# Patient Record
Sex: MALE | Race: WHITE | NOT HISPANIC OR LATINO | URBAN - METROPOLITAN AREA
[De-identification: names, ages, dates, MRNs, and addresses within clinical notes are randomized per-mention and may not be internally consistent; named-entity substitution may affect disease eponyms.]

---

## 2019-05-03 ENCOUNTER — EMERGENCY (EMERGENCY)
Facility: HOSPITAL | Age: 49
LOS: 1 days | Discharge: HOME | End: 2019-05-03
Attending: EMERGENCY MEDICINE
Payer: COMMERCIAL

## 2019-05-03 VITALS
SYSTOLIC BLOOD PRESSURE: 146 MMHG | OXYGEN SATURATION: 100 % | DIASTOLIC BLOOD PRESSURE: 87 MMHG | TEMPERATURE: 98 F | WEIGHT: 190.04 LBS | HEIGHT: 72 IN | HEART RATE: 61 BPM | RESPIRATION RATE: 18 BRPM

## 2019-05-03 PROCEDURE — 99283 EMERGENCY DEPT VISIT LOW MDM: CPT

## 2019-05-03 NOTE — ED PROVIDER NOTE - OBJECTIVE STATEMENT
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 Patient states he was lifting a heavy piece of exercise equipment this AM when he felt a popping sensation to his left chest associated with significant pain. No pain to shoulder or LUE. Endorsing decreased weakness to LUE (lifting, pushing/pulling). No other associated sx: no numbness to LUE, no fevers or chills, nausea or vomiting, headache, dizziness, shortness of breath, abdominal pain. No prior hx of injury to LUE, left shoulder, chest wall. No meds, no allergies. No tobacco.

## 2019-05-03 NOTE — ED PROVIDER NOTE - PHYSICAL EXAMINATION
General: Well appearing, alert, oriented, no acute distress. Resting on bed.  HEENT: PERRLA EOMI. No trauma/bruising noted to head or face.   CV: Regular rate and rhythm, S1/S2, no murmurs/rubs/gallops noted on exam.  Lungs: Clear to ascultation bilaterally, no wheezes/crackles/rales noted on exam.    MSK: Full ROM of lower extremities bilaterally and RUE. Full ROM of neck.     Left upper extremity: Mildly limited active abduction of LUE, full passive abduction of LUE. Otherwise full ROM of LUE, active and passive flexion/extension/adduction. No skeletal bony changes noted, no clinical signs of shoulder dislocation noted. No tenderness to palpation to left shoulder. Bicep/tricep strength intact.  strength intact. Decreased strength to pulling/pushing with LUE.   Significant pain to palpation to area of left superior enrique-lateral chest wall.     Neuro: Awake, A+O x4, moving all extremities spontaneously. CN 2-12 grossly intact. No nystagmus noted. Strength and sensation grossly intact to all extremities EXCEPT LUE as mentioned above. Ambulatory w/o assist, normal gait.   Extremities: No swelling or edema noted to extremities. No calf tenderness to palpation.   Skin: No rash or bruising noted on exam.

## 2019-05-03 NOTE — ED PROVIDER NOTE - CLINICAL SUMMARY MEDICAL DECISION MAKING FREE TEXT BOX
48M, presenting with left chest wall pain and decreased strength to LUE after lifting heavy equipment this AM. Exam as above. 48M, presenting with left chest wall pain and decreased strength to LUE after lifting heavy equipment this AM. Exam as above. Low clinical suspicion for skeletal injury. High clinical suspicion for acute muscular injury. Will require ortho f/u for advanced imaging and/or surgery and/or PT and/or further intervention. Pain control declined at this time. Anticipate d/c home with strict instructions on limitation of movement/lifting, work restriction, and ortho/PMD f/u  Yovany Carcamo MD, PGY2 Emergency Medicine

## 2019-05-03 NOTE — ED PROVIDER NOTE - NS ED ROS FT
Please see HPI section of chart for further detailed Review of Systems    left anterior chest wall pain

## 2019-05-03 NOTE — ED PROVIDER NOTE - NSFOLLOWUPCLINICS_GEN_ALL_ED_FT
[facility]  Orthopedic Surgery  [address] [address]  Phone: [phone]  Fax:     [facility]  Sports Medicine  [address] [place] [address]  Phone: [phone]  Fax:   Follow Up Time:

## 2019-05-03 NOTE — ED PROVIDER NOTE - NSFOLLOWUPINSTRUCTIONS_ED_ALL_ED_FT
Please follow up with Orthopedics and/or Sports Medicine this week for further care    Wear sling for comfort only, ensure frequent range of motion exercises     Return to hospital for any new or concerning symptoms, including but not limited to: fevers, chills, nausea, vomiting, headache, dizziness, lightheadedness, chest pain, shortness of breath, difficulty breathing, abdominal pain, weakness, or any other new or concerning symptoms.    Take Tylenol up to 650 mg every 6 hours as needed for pain.  Take motrin 600mg every 6 hours as needed for pain

## 2019-05-03 NOTE — ED ADULT NURSE NOTE - OBJECTIVE STATEMENT
48 y m came to the ed c/o left shoulder pain. patient said he was moving heavy gym equipment around 715 this morning. states hearing a pop then severe pain in the left upper chest/shoulder area. denies numbness/tingling. able to move extremity although excessive movements cause severe pain. good  strength

## 2019-05-03 NOTE — ED PROVIDER NOTE - ATTENDING CONTRIBUTION TO CARE
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 Pt developed pain s/p lifting heavy piece of exercise equipment this AM. Denies sob, palpitations, radiation of pain. Worse with movement of arm. Denies numbness, tingling, coldness to hand. Denies any other injuries.    PE: Well appearing male in NAD, NCAT, MMM, Trachea midline, Normal conjunctiva, lungs CTAB, S1/S2 RRR, Normal perfusion, 2+ radial pulses bilat, Abdomen Soft, NTND, No rebound/guarding, No LE edema, No deformity of extremities, No rashes. + L super-lateral chest wall ttp, 5/5 strength bilat UE elbow flex/ext, wrist flex/ext, intrinsic muscles hands. Sensation grossly intact bilat UE. +Pain, limited strength internal rotation L arm, external rotation intact. Pain with passive ROM L shoulder, no ttp of L shoulder.    C/w muscular strain vs tear, likely pectoral. Pt offered XR, but does not wish to have performed, I believe the likelihood of fracture or other acute pathology is extremely low. Declining pain meds. Will f/u with orthopedics/sports med. Will give sling for comfort. To d/c. Return precautions discussed with patient in detail. - Bradford Brandt MD

## 2020-12-08 PROBLEM — Z78.9 OTHER SPECIFIED HEALTH STATUS: Chronic | Status: ACTIVE | Noted: 2019-05-03

## 2020-12-11 DIAGNOSIS — Z01.818 ENCOUNTER FOR OTHER PREPROCEDURAL EXAMINATION: ICD-10-CM

## 2020-12-11 PROBLEM — Z00.00 ENCOUNTER FOR PREVENTIVE HEALTH EXAMINATION: Status: ACTIVE | Noted: 2020-12-11

## 2020-12-12 ENCOUNTER — APPOINTMENT (OUTPATIENT)
Dept: DISASTER EMERGENCY | Facility: CLINIC | Age: 50
End: 2020-12-12

## 2020-12-14 VITALS
TEMPERATURE: 98 F | OXYGEN SATURATION: 98 % | RESPIRATION RATE: 12 BRPM | HEART RATE: 65 BPM | WEIGHT: 184.97 LBS | SYSTOLIC BLOOD PRESSURE: 130 MMHG | DIASTOLIC BLOOD PRESSURE: 75 MMHG | HEIGHT: 71 IN

## 2020-12-14 LAB — SARS-COV-2 N GENE NPH QL NAA+PROBE: NOT DETECTED

## 2020-12-14 NOTE — H&P PST ADULT - ASSESSMENT
This a 50 year old male PMH :  Recent Covid positive recovered 11/2/20  with no other medical history . He presented to [facility] with complaints of dyspnea and palpitations. He was referred to a cardiologist who  sent him for Coronary CT angio  Holter monitor and NST. His NST was abnormal with anterior ischemia noted. He was started on a aspirin and  referred for a cardiac cath      Plan: PRE-PROCEDURE ASSESSMENT  Cardiac cath with possible intervention   -Patient seen and examined  -Labs reviewed  -Pre-procedure teaching completed with patient   -Questions answered about patients concerns    -instructed to NPO after midnight.

## 2020-12-14 NOTE — H&P PST ADULT - HISTORY OF PRESENT ILLNESS
Narrative: This a 50 year old male PMH :  Recent Covid positive recovered 11/2/20  with no other medical history . He presented to [facility] with complaints of dyspnea and palpitations. He was referred to a cardiologist who  sent him for Coronary CT angio  Holter monitor and NST. His NST was abnormal with anterior ischemia noted. He was started on a aspirin and  referred for a cardiac cath     Symptoms:        Angina (Class):        Ischemic Symptoms: dyspnea      Heart Failure:        Systolic/Diastolic/Combined:        NYHA Class (within 2 weeks):     Assessment of LVEF (Must be within 6 months):       EF: 57 %        Assessed by: NST        Date: 11/11/20     Prior Cardiac Interventions (LHC, stents, CABG): none            Noninvasive Testing:   Stress Test: Date: 11/11/20        Protocol: Tam        Duration of Exercise: stage III        Symptoms: none        EKG Changes:        DTS:        Myocardial Imaging: ischemia Equivocal , normal LV function with a  EF 57 %  with a small zone of anterior ischemia        Risk Assessment (Low, Medium, High):     Echo: 11/16/20: Normal LV size and function, wall motion and diastolic filling , normal right and left atria and right ventricular, mild valvular abnormalities      Antianginal Therapies:        Beta Blockers:         Calcium Channel Blockers:        Long Acting Nitrates:        Ranexa:     Associated Risk Factors:        Cerebrovascular Disease: N/A       Chronic Lung Disease: N/A       Peripheral Arterial Disease: N/A       Chronic Kidney Disease (if yes, what is GFR): N/A       Uncontrolled Diabetes (if yes, what is HgbA1C or FBS): N/A       Poorly Controlled Hypertension (if yes, what is SBP): N/A       Morbid Obesity (if yes, what is BMI): N/A       History of Recent Ventricular Arrhythmia: N/A       Inability to Ambulate Safely: N/A       Need for Therapeutic Anticoagulation: N/A       Antiplatelet or Contrast Allergy: N/A Narrative: This a 50 year old male PMH :  Recent Covid positive recovered 11/2/20  with no other medical history . He presented to [facility] with complaints of dyspnea and palpitations. He was referred to a cardiologist who  sent him for Coronary CT angio  Holter monitor and NST. His NST was abnormal with anterior ischemia noted. He was started on a aspirin and  referred for a cardiac cath     Symptoms:        Angina (Class): Anginal equivalent class 2-3       Ischemic Symptoms: dyspnea      Heart Failure:        Systolic/Diastolic/Combined:        NYHA Class (within 2 weeks):     Assessment of LVEF (Must be within 6 months):       EF: 57 %        Assessed by: NST        Date: 11/11/20     Prior Cardiac Interventions (LHC, stents, CABG): none            Noninvasive Testing:   Stress Test: Date: 11/11/20        Protocol: Tam        Duration of Exercise: stage III        Symptoms: none        EKG Changes:        DTS:        Myocardial Imaging: ischemia Equivocal , normal LV function with a  EF 57 %  with a small zone of anterior ischemia        Risk Assessment (Low, Medium, High):     Echo: 11/16/20: Normal LV size and function, wall motion and diastolic filling , normal right and left atria and right ventricular, mild valvular abnormalities      Antianginal Therapies:        Beta Blockers:         Calcium Channel Blockers:        Long Acting Nitrates:        Ranexa:     Associated Risk Factors:        Cerebrovascular Disease: N/A       Chronic Lung Disease: N/A       Peripheral Arterial Disease: N/A       Chronic Kidney Disease (if yes, what is GFR): N/A       Uncontrolled Diabetes (if yes, what is HgbA1C or FBS): N/A       Poorly Controlled Hypertension (if yes, what is SBP): N/A       Morbid Obesity (if yes, what is BMI): N/A       History of Recent Ventricular Arrhythmia: N/A       Inability to Ambulate Safely: N/A       Need for Therapeutic Anticoagulation: N/A       Antiplatelet or Contrast Allergy: N/A Narrative: This a 50 year old male PMH :  Recent Covid positive recovered 11/2/20  with no other medical history . He presented to [facility] with complaints of dyspnea and palpitations. He was referred to a cardiologist who  sent him for Coronary CT angio  Holter monitor and NST. His NST was abnormal with anterior ischemia noted. He was started on a aspirin and  referred for a cardiac cath     ASA 2  Mallampati II  GFR 99  Creat 0.9  Bleeding Risk 1.6%  COVID19 - 12/14/2020 negative    Symptoms:        Angina (Class): Anginal equivalent class 2-3       Ischemic Symptoms: dyspnea      Heart Failure:        Systolic/Diastolic/Combined:        NYHA Class (within 2 weeks):     Assessment of LVEF (Must be within 6 months):       EF: 57 %        Assessed by: NST        Date: 11/11/20     Prior Cardiac Interventions (LHC, stents, CABG): none            Noninvasive Testing:   Stress Test: Date: 11/11/20        Protocol: Tam        Duration of Exercise: stage III        Symptoms: none        EKG Changes:        DTS:        Myocardial Imaging: ischemia Equivocal , normal LV function with a  EF 57 %  with a small zone of anterior ischemia        Risk Assessment (Low, Medium, High):     Echo: 11/16/20: Normal LV size and function, wall motion and diastolic filling , normal right and left atria and right ventricular, mild valvular abnormalities      Antianginal Therapies:        Beta Blockers:         Calcium Channel Blockers:        Long Acting Nitrates:        Ranexa:     Associated Risk Factors:        Cerebrovascular Disease: N/A       Chronic Lung Disease: N/A       Peripheral Arterial Disease: N/A       Chronic Kidney Disease (if yes, what is GFR): N/A       Uncontrolled Diabetes (if yes, what is HgbA1C or FBS): N/A       Poorly Controlled Hypertension (if yes, what is SBP): N/A       Morbid Obesity (if yes, what is BMI): N/A       History of Recent Ventricular Arrhythmia: N/A       Inability to Ambulate Safely: N/A       Need for Therapeutic Anticoagulation: N/A       Antiplatelet or Contrast Allergy: N/A

## 2020-12-14 NOTE — H&P PST ADULT - NSICDXPASTSURGICALHX_GEN_ALL_CORE_FT
PAST SURGICAL HISTORY:  No significant past surgical history     No significant past surgical history      PAST SURGICAL HISTORY:  No significant past surgical history     S/P shoulder surgery left 2019

## 2020-12-14 NOTE — H&P PST ADULT - NSICDXPASTMEDICALHX_GEN_ALL_CORE_FT
PAST MEDICAL HISTORY:  History of 2019 novel coronavirus disease (COVID-19) recovered 11/2/20    No pertinent past medical history     No pertinent past medical history

## 2020-12-14 NOTE — H&P PST ADULT - NEGATIVE NEUROLOGICAL SYMPTOMS
no difficulty walking/no focal seizures/no syncope/no headache/no weakness/no generalized seizures/no tremors/no vertigo/no loss of sensation/no paresthesias/no transient paralysis

## 2020-12-15 ENCOUNTER — OUTPATIENT (OUTPATIENT)
Dept: OUTPATIENT SERVICES | Facility: HOSPITAL | Age: 50
LOS: 1 days | End: 2020-12-15
Payer: COMMERCIAL

## 2020-12-15 ENCOUNTER — TRANSCRIPTION ENCOUNTER (OUTPATIENT)
Age: 50
End: 2020-12-15

## 2020-12-15 VITALS
HEART RATE: 65 BPM | SYSTOLIC BLOOD PRESSURE: 118 MMHG | OXYGEN SATURATION: 98 % | DIASTOLIC BLOOD PRESSURE: 74 MMHG | RESPIRATION RATE: 20 BRPM

## 2020-12-15 DIAGNOSIS — R94.39 ABNORMAL RESULT OF OTHER CARDIOVASCULAR FUNCTION STUDY: ICD-10-CM

## 2020-12-15 DIAGNOSIS — Z98.890 OTHER SPECIFIED POSTPROCEDURAL STATES: Chronic | ICD-10-CM

## 2020-12-15 LAB
ANION GAP SERPL CALC-SCNC: 11 MMOL/L — SIGNIFICANT CHANGE UP (ref 5–17)
APTT BLD: 34.2 SEC — SIGNIFICANT CHANGE UP (ref 27.5–35.5)
BUN SERPL-MCNC: 22 MG/DL — HIGH (ref 8–20)
CALCIUM SERPL-MCNC: 9.1 MG/DL — SIGNIFICANT CHANGE UP (ref 8.6–10.2)
CHLORIDE SERPL-SCNC: 103 MMOL/L — SIGNIFICANT CHANGE UP (ref 98–107)
CO2 SERPL-SCNC: 27 MMOL/L — SIGNIFICANT CHANGE UP (ref 22–29)
CREAT SERPL-MCNC: 0.9 MG/DL — SIGNIFICANT CHANGE UP (ref 0.5–1.3)
GLUCOSE SERPL-MCNC: 98 MG/DL — SIGNIFICANT CHANGE UP (ref 70–99)
HCT VFR BLD CALC: 51.1 % — HIGH (ref 39–50)
HGB BLD-MCNC: 17.6 G/DL — HIGH (ref 13–17)
INR BLD: 1.08 RATIO — SIGNIFICANT CHANGE UP (ref 0.88–1.16)
MCHC RBC-ENTMCNC: 30.3 PG — SIGNIFICANT CHANGE UP (ref 27–34)
MCHC RBC-ENTMCNC: 34.4 GM/DL — SIGNIFICANT CHANGE UP (ref 32–36)
MCV RBC AUTO: 88 FL — SIGNIFICANT CHANGE UP (ref 80–100)
PLATELET # BLD AUTO: 225 K/UL — SIGNIFICANT CHANGE UP (ref 150–400)
POTASSIUM SERPL-MCNC: 3.9 MMOL/L — SIGNIFICANT CHANGE UP (ref 3.5–5.3)
POTASSIUM SERPL-SCNC: 3.9 MMOL/L — SIGNIFICANT CHANGE UP (ref 3.5–5.3)
PROTHROM AB SERPL-ACNC: 12.5 SEC — SIGNIFICANT CHANGE UP (ref 10.6–13.6)
RBC # BLD: 5.81 M/UL — HIGH (ref 4.2–5.8)
RBC # FLD: 12.5 % — SIGNIFICANT CHANGE UP (ref 10.3–14.5)
SODIUM SERPL-SCNC: 141 MMOL/L — SIGNIFICANT CHANGE UP (ref 135–145)
WBC # BLD: 4.09 K/UL — SIGNIFICANT CHANGE UP (ref 3.8–10.5)
WBC # FLD AUTO: 4.09 K/UL — SIGNIFICANT CHANGE UP (ref 3.8–10.5)

## 2020-12-15 PROCEDURE — 85610 PROTHROMBIN TIME: CPT

## 2020-12-15 PROCEDURE — 93005 ELECTROCARDIOGRAM TRACING: CPT

## 2020-12-15 PROCEDURE — 93458 L HRT ARTERY/VENTRICLE ANGIO: CPT

## 2020-12-15 PROCEDURE — 93010 ELECTROCARDIOGRAM REPORT: CPT

## 2020-12-15 PROCEDURE — C1760: CPT

## 2020-12-15 PROCEDURE — 80048 BASIC METABOLIC PNL TOTAL CA: CPT

## 2020-12-15 PROCEDURE — C1769: CPT

## 2020-12-15 PROCEDURE — 99152 MOD SED SAME PHYS/QHP 5/>YRS: CPT

## 2020-12-15 PROCEDURE — C1887: CPT

## 2020-12-15 PROCEDURE — 85027 COMPLETE CBC AUTOMATED: CPT

## 2020-12-15 PROCEDURE — 36415 COLL VENOUS BLD VENIPUNCTURE: CPT

## 2020-12-15 PROCEDURE — C1894: CPT

## 2020-12-15 PROCEDURE — 85730 THROMBOPLASTIN TIME PARTIAL: CPT

## 2020-12-15 RX ORDER — ASPIRIN/CALCIUM CARB/MAGNESIUM 324 MG
81 TABLET ORAL
Qty: 0 | Refills: 0 | DISCHARGE

## 2020-12-15 RX ORDER — ASPIRIN/CALCIUM CARB/MAGNESIUM 324 MG
81 TABLET ORAL DAILY
Refills: 0 | Status: DISCONTINUED | OUTPATIENT
Start: 2020-12-15 | End: 2020-12-29

## 2020-12-15 RX ORDER — ATORVASTATIN CALCIUM 80 MG/1
1 TABLET, FILM COATED ORAL
Qty: 0 | Refills: 0 | DISCHARGE

## 2020-12-15 RX ADMIN — Medication 81 MILLIGRAM(S): at 11:12

## 2020-12-15 NOTE — ASU PATIENT PROFILE, ADULT - PMH
History of 2019 novel coronavirus disease (COVID-19)  recovered 11/2/20  No pertinent past medical history    No pertinent past medical history

## 2020-12-15 NOTE — DISCHARGE NOTE PROVIDER - CARE PROVIDER_API CALL
Freddy Duarte)  Cardiovascular Disease; Internal Medicine  [address] [place] [address]  Phone: [phone]  Fax: [phone]  Follow Up Time: 1 month

## 2020-12-15 NOTE — DISCHARGE NOTE PROVIDER - NSDCCPCAREPLAN_GEN_ALL_CORE_FT
PRINCIPAL DISCHARGE DIAGNOSIS  Diagnosis: Abnormal stress ECG  Assessment and Plan of Treatment:

## 2020-12-15 NOTE — PROGRESS NOTE ADULT - SUBJECTIVE AND OBJECTIVE BOX
Nurse Practitioner Progress note:   s/p [facility]   No CAD      Patient feels well.  Denies chest pain, shortness of breath, dizziness or palpitations at this time    Right groin procedure site with angioseal CDI.  No bleeding, no hematoma, site soft, non tender, positive pedal pulses bilaterally      T(C): 36.5 (12-15-20 @ 11:29), Max: 36.5 (12-15-20 @ 11:29)  HR: 65 (12-15-20 @ 15:15) (65 - 65)  BP: 115/69 (12-15-20 @ 15:15) (115/69 - 130/75)  RR: 14 (12-15-20 @ 15:15) (14 - 17)  SpO2: 100% (12-15-20 @ 15:10) (100% - 100%)    CBC Full  -  ( 15 Dec 2020 12:17 )  WBC Count : 4.09 K/uL  RBC Count : 5.81 M/uL  Hemoglobin : 17.6 g/dL  Hematocrit : 51.1 %  Platelet Count - Automated : 225 K/uL  Mean Cell Volume : 88.0 fl  Mean Cell Hemoglobin : 30.3 pg  Mean Cell Hemoglobin Concentration : 34.4 gm/dL      12-15    141  |  103  |  22.0<H>  ----------------------------<  98  3.9   |  27.0  |  0.90    Ca    9.1      15 Dec 2020 12:17              MEDICATIONS  (STANDING):  aspirin  chewable 81 milliGRAM(s) Oral daily    MEDICATIONS  (PRN):        HPI:  Narrative: This a 50 year old male PMH :  Recent Covid positive recovered 11/2/20  with no other medical history . He presented to [facility] with complaints of dyspnea and palpitations. He was referred to a cardiologist who  sent him for Coronary CT angio  Holter monitor and NST. His NST was abnormal with anterior ischemia noted. He was started on a aspirin and  referred for a cardiac cath       now s/p [facility]        ASSESSMENT/PLAN:    Abnormal   - No significant coronary artery disease  -Recover patient for 3 hours  -Resume po diet  -Ambulate patient p 3 hours  -Discharge to home with escort if procedure site and patient remain stable after patient eats, ambulates and toilets  -Resume all home medications  -Plan of care discussed with patient, family and MD  -Follow-up with attending cardiologist Dr Duarte within 1 month  -Discussed therapeutic lifestyle changes to reduce risk factors such as following a cardiac diet, weight loss, maintaining a healthy weight, exercise, smoking cessation, medication compliance, and regular follow-up with MD to know your numbers (BP, cholesterol, weight, and glucose)

## 2020-12-15 NOTE — CONSULT NOTE ADULT - SUBJECTIVE AND OBJECTIVE BOX
Patient is a 50y old  Male who presents with a chief complaint of Dyspnea and palpitations      HPI:  This a 50 year old male PMH :  Recent Covid positive recovered 11/2/20  with no other medical history . He presented to [facility] with complaints of dyspnea and palpitations. He was referred to a cardiologist who  sent him for Holter monitor and NST. His NST was abnormal with anterior ischemia noted. He was started on a aspirin.       Noninvasive Testing:   Stress Test: Date: 11/11/20        Protocol: Tam        Duration of Exercise: stage III        Symptoms: none        EKG Changes:        DTS:        Myocardial Imaging: ischemia Equivocal , normal LV function with a  EF 57 %  with a small zone of anterior ischemia        Risk Assessment (Low, Medium, High):     Echo: 11/16/20: Normal LV size and function, wall motion and diastolic filling , normal right and left atria and right ventricular, mild valvular abnormalities      PAST MEDICAL & SURGICAL HISTORY:  History of 2019 novel coronavirus disease (COVID-19)  recovered 11/2/20    No pertinent past medical history    No pertinent past medical history    S/P shoulder surgery  left 2019    No significant past surgical history      Allergies    No Known Allergies    Intolerances        MEDICATIONS  (STANDING):  aspirin  chewable 81 milliGRAM(s) Oral daily    MEDICATIONS  (PRN):    aspirin  chewable 81 milliGRAM(s) Oral daily      FAMILY HISTORY:  NC      SOCIAL HISTORY:    CIGARETTES:  Never    ALCOHOL:  Rare    REVIEW OF SYSTEMS:  CONSTITUTIONAL: No fever, weight loss, or fatigue  EYES: No eye pain, visual disturbances, or discharge  ENMT:  No difficulty hearing, tinnitus, vertigo; No sinus or throat pain  NECK: No pain or stiffness  RESPIRATORY: No cough, wheezing, chills or hemoptysis;   CARDIOVASCULAR: No chest pain,  passing out, dizziness, or leg swelling  GASTROINTESTINAL: No abdominal or epigastric pain. No nausea, vomiting, or hematemesis; No diarrhea or constipation. No melena or hematochezia.  GENITOURINARY: No dysuria, frequency, hematuria, or incontinence  NEUROLOGICAL: No headaches, memory loss, loss of strength, numbness, or tremors  SKIN: No itching, burning, rashes, or lesions   LYMPH Nodes: No enlarged glands  ENDOCRINE: No heat or cold intolerance; No hair loss  MUSCULOSKELETAL: No joint pain or swelling; No muscle, back, or extremity pain  PSYCHIATRIC: No depression, anxiety, mood swings, or difficulty sleeping  HEME/LYMPH: No easy bruising, or bleeding gums  ALLERY AND IMMUNOLOGIC: No hives or eczema	    Vital Signs Last 24 Hrs  T(C): 36.5 (15 Dec 2020 11:29), Max: 36.5 (15 Dec 2020 11:29)  T(F): 97.7 (15 Dec 2020 11:29), Max: 97.7 (15 Dec 2020 11:29)  HR: 65 (15 Dec 2020 18:15) (65 - 75)  BP: 118/74 (15 Dec 2020 18:15) (113/59 - 130/75)  BP(mean): --  RR: 20 (15 Dec 2020 18:15) (14 - 20)  SpO2: 98% (15 Dec 2020 18:15) (97% - 100%)    Daily     Daily     I&O's Detail      PHYSICAL EXAM:  Appearance: Normal, well nourished	  HEENT:   Normal oral mucosa, PERRL, EOMI, sclera non-icteric	  Lymphatic: No cervical lymphadenopathy  Cardiovascular: Normal S1 S2, No JVD, No cardiac murmurs, No carotid bruits, No peripheral edema  Respiratory: Lungs clear to auscultation	  Psychiatry: A & O x 3, Mood & affect appropriate  Gastrointestinal:  Soft, Non-tender, + BS, no bruits	  Skin: No rashes, No ecchymoses, No cyanosis  Neurologic: Grossly non-focal with full strength in all four extremities  Extremities: Normal range of motion, No clubbing, cyanosis or edema  Vascular: Peripheral pulses palpable 2+ bilaterally      INTERPRETATION OF TELEMETRY:    ECG:    LABS:                        17.6   4.09  )-----------( 225      ( 15 Dec 2020 12:17 )             51.1     12-15    141  |  103  |  22.0<H>  ----------------------------<  98  3.9   |  27.0  |  0.90    Ca    9.1      15 Dec 2020 12:17          PT/INR - ( 15 Dec 2020 12:17 )   PT: 12.5 sec;   INR: 1.08 ratio         PTT - ( 15 Dec 2020 12:17 )  PTT:34.2 sec    I&O's Summary    BNP  RADIOLOGY & ADDITIONAL STUDIES:    Assessment:  This a 50 year old male PMH :  Recent Covid positive recovered 11/2/20  with no other medical history . He presented to [facility] with complaints of dyspnea and palpitations. He was referred to a cardiologist who  sent him for Holter monitor and NST. His NST was abnormal with anterior ischemia noted. He was started on a aspirin.     Plan:  Cardiac catheterization and possible percutaneous intervention recommended.  Risks, benefits, and alternatives reviewed.  Risks including but not limited to MI, death, stroke, bleeding, infection, vessel injury, hematoma, renal failure, allergic reaction, urgent open heart surgery, restenosis and stent thrombosis were reviewed.  All questions answered.  Patient is agreeable to proceed.

## 2020-12-15 NOTE — DISCHARGE NOTE PROVIDER - NSDCMRMEDTOKEN_GEN_ALL_CORE_FT
aspirin 81 mg oral tablet: 81 milligram(s) orally once a day  atorvastatin 40 mg oral tablet: 1 tab(s) orally once a day

## 2020-12-15 NOTE — DISCHARGE NOTE NURSING/CASE MANAGEMENT/SOCIAL WORK - PATIENT PORTAL LINK FT
You can access the FollowMyHealth Patient Portal offered by [facility] by registering at the following website: [email or web address]. By joining [facility]’s FollowMyHealth portal, you will also be able to view your health information using other applications (apps) compatible with our system.

## 2020-12-15 NOTE — DISCHARGE NOTE PROVIDER - HOSPITAL COURSE
HPI:  Narrative: This a 50 year old male PMH :  Recent Covid positive recovered 11/2/20  with no other medical history . He presented to [facility] with complaints of dyspnea and palpitations. He was referred to a cardiologist who  sent him for Coronary CT angio  Holter monitor and NST. His NST was abnormal with anterior ischemia noted. He was started on a aspirin and  referred for a cardiac cath       now s/p [facility]        ASSESSMENT/PLAN:    Abnormal   - No significant coronary artery disease  -Recovered patient for 3 hours  -Resumed po diet  -Ambulated patient p 2 hours  -Discharged to home with escort if procedure site   -Resumed all home medications  -Plan of care discussed with patient, family and MD  -Follow-up with attending cardiologist Dr Duarte within 1 month  -Discussed therapeutic lifestyle changes to reduce risk factors such as following a cardiac diet, weight loss, maintaining a healthy weight, exercise, smoking cessation, medication compliance, and regular follow-up with MD to know your numbers (BP, cholesterol, weight, and glucose)

## 2021-07-06 NOTE — ASU PATIENT PROFILE, ADULT - AS [facility] [employer]
Ursula Heads called requesting a refill of the below medication which has been pended for you:     Requested Prescriptions     Pending Prescriptions Disp Refills    pioglitazone (ACTOS) 45 MG tablet [Pharmacy Med Name: Pioglitazone HCl 45 MG Oral Tablet] 90 tablet 0     Sig: Take 1 tablet by mouth once daily       Last Appointment Date: 3/25/2021  Next Appointment Date: 7/29/2021    No Known Allergies (4) no limitation

## 2021-07-07 NOTE — H&P PST ADULT - SURGICAL SITE INCISION
Detail Level: Simple Render Risk Assessment In Note?: no Additional Notes: The patient currently has an ophthalmologist Dr. Davion Shine in [place] and agreed to call and schedule a close follow up to discuss right lower eyelid entropion. This has been present for the past few years. Discussed gentle eyelid scrubs for any accompany blepharitis associated with his seborrheic dermatitis. no

## 2022-07-23 PROBLEM — Z86.19 PERSONAL HISTORY OF OTHER INFECTIOUS AND PARASITIC DISEASES: Chronic | Status: ACTIVE | Noted: 2020-12-14

## 2022-07-25 ENCOUNTER — RESULT REVIEW (OUTPATIENT)
Age: 52
End: 2022-07-25

## 2022-07-25 ENCOUNTER — APPOINTMENT (OUTPATIENT)
Dept: CT IMAGING | Facility: CLINIC | Age: 52
End: 2022-07-25

## 2022-07-25 ENCOUNTER — OUTPATIENT (OUTPATIENT)
Dept: OUTPATIENT SERVICES | Facility: HOSPITAL | Age: 52
LOS: 1 days | End: 2022-07-25
Payer: SELF-PAY

## 2022-07-25 DIAGNOSIS — Z00.8 ENCOUNTER FOR OTHER GENERAL EXAMINATION: ICD-10-CM

## 2022-07-25 DIAGNOSIS — Z98.890 OTHER SPECIFIED POSTPROCEDURAL STATES: Chronic | ICD-10-CM

## 2022-07-25 PROCEDURE — 75571 CT HRT W/O DYE W/CA TEST: CPT

## 2022-07-25 PROCEDURE — 75571 CT HRT W/O DYE W/CA TEST: CPT | Mod: 26

## 2022-07-29 ENCOUNTER — APPOINTMENT (OUTPATIENT)
Dept: CARDIOLOGY | Facility: CLINIC | Age: 52
End: 2022-07-29

## 2022-07-29 VITALS
OXYGEN SATURATION: 98 % | WEIGHT: 195 LBS | HEIGHT: 71 IN | SYSTOLIC BLOOD PRESSURE: 128 MMHG | RESPIRATION RATE: 16 BRPM | HEART RATE: 90 BPM | TEMPERATURE: 98 F | DIASTOLIC BLOOD PRESSURE: 88 MMHG | BODY MASS INDEX: 27.3 KG/M2

## 2022-07-29 DIAGNOSIS — R06.09 OTHER FORMS OF DYSPNEA: ICD-10-CM

## 2022-07-29 DIAGNOSIS — R01.1 CARDIAC MURMUR, UNSPECIFIED: ICD-10-CM

## 2022-07-29 PROCEDURE — 93015 CV STRESS TEST SUPVJ I&R: CPT

## 2022-07-29 PROCEDURE — 99203 OFFICE O/P NEW LOW 30 MIN: CPT | Mod: 25

## 2022-07-29 NOTE — PHYSICAL EXAM
[Well Developed] : well developed [Well Nourished] : well nourished [No Acute Distress] : no acute distress [Normal Conjunctiva] : normal conjunctiva [Normal Venous Pressure] : normal venous pressure [No Carotid Bruit] : no carotid bruit [Normal S1, S2] : normal S1, S2 [No Rub] : no rub [5th Left ICS - MCL] : palpated at the 5th LICS in the midclavicular line [Normal] : normal [No Precordial Heave] : no precordial heave was noted [Normal Rate] : normal [Rhythm Regular] : regular [Normal S1] : normal S1 [Normal S2] : normal S2 [No Gallop] : no gallop heard [I] : a grade 1 [No Pitting Edema] : no pitting edema present [2+] : left 2+ [No Abnormalities] : the abdominal aorta was not enlarged and no bruit was heard [Clear Lung Fields] : clear lung fields [Good Air Entry] : good air entry [No Respiratory Distress] : no respiratory distress  [Soft] : abdomen soft [Non Tender] : non-tender [No Masses/organomegaly] : no masses/organomegaly [Normal Bowel Sounds] : normal bowel sounds [Normal Gait] : normal gait [No Edema] : no edema [No Cyanosis] : no cyanosis [No Clubbing] : no clubbing [No Varicosities] : no varicosities [No Rash] : no rash [No Skin Lesions] : no skin lesions [Moves all extremities] : moves all extremities [No Focal Deficits] : no focal deficits [Normal Speech] : normal speech [Alert and Oriented] : alert and oriented [Normal memory] : normal memory [Apical Thrill] : no thrill palpable at the apex [S3] : no S3 [S4] : no S4 [Click] : no click [Distant] : the heart sounds were ~L not distant [Pericardial Rub] : no pericardial rub [Right Carotid Bruit] : no bruit heard over the right carotid [Left Carotid Bruit] : no bruit heard over the left carotid [Right Femoral Bruit] : no bruit heard over the right femoral artery [Left Femoral Bruit] : no bruit heard over the left femoral artery [Bruit] : no bruit heard

## 2022-07-29 NOTE — REASON FOR VISIT
[CV Risk Factors and Non-Cardiac Disease] : CV risk factors and non-cardiac disease [Other: ____] : [unfilled] [FreeTextEntry1] : This is a 52 year old with no past medical history who comes in for cardiac evaluation. Patient is soon to retire. Patient had a cardiac cath performed in December 15th, 2020 at [facility], which did not reveal any abnormalities. CT Heart Calcium score obtained on July 25, 2022 was 0. \par \par Patient admits to occasional dyspnea on exertion but otherwise states that he is doing well. Patient also denies chest pain, shortness of breath, dizziness, palpitation, or syncope. \par \par Patient tested positive for COVID-19 in October 2020. \par

## 2022-07-29 NOTE — DISCUSSION/SUMMARY
[FreeTextEntry1] : This is a 52 year old white male with a past medical history significant for status post COVID-19 infection 2020, who comes in for cardiac consultation.  He denies chest pain, shortness of breath, dizziness or syncope.  He has no history of rheumatic fever.  He does not drink excessive caffeine or alcohol.\par He has no known cardiac risk factors.\par The patient reported that he was having palpitations after his COVID-19 infection and/or vaccine, which ultimately led to further cardiac evaluation and the recommendation for cardiac catheterization.\par The patient had a cardiac catheterization done December 15, 2020 which demonstrated normal coronary arteries with slight luminal irregularity in the mid left anterior descending artery but no flow-limiting lesions.\par The patient had a coronary artery calcium score done July 25, 2022 of 0.\par Exercise stress test done July 29, 2022 was within normal limits.\par Screening echo Doppler examination done July 19, 2022 demonstrated physiologic tricuspid and pulmonic valve regurgitation with minimal to mild mitral valve regurgitation normal ejection fraction of 65 to 70%.\par Patient tested positive for COVID-19 in October 2020.\par The patient is currently hemodynamically stable and asymptomatic from a cardiac standpoint.  He will follow-up with his primary cardiologist in [place].  He will also follow-up with his primary care physician.\par The patient understands that aerobic exercises must be increased to 40 minutes 4 times per week. A detailed discussion of lifestyle modification was done today. The patient has a good understanding of the diagnosis, and treatment plan. Lifestyle modification was also outlined.\par

## 2023-07-17 NOTE — ASU PATIENT PROFILE, ADULT - NS PRO ABUSE SCREEN AFRAID ANYONE YN
[facility]    Who is the name of the provider?:  Balta   What is the location you see this provider at/preferred location?: [place]  Person calling / Facility: [facility]  Phone number:  [phone]  Nurse call back needed:  unknown    Reason for call:  Pt daughter called wanting the instruction for patients procedure tomorrow. I read off the instructions but then she had questions about medication.    Pt daughter would like the team to address these questions in mychart so she can view mychart after work.    She wants to know how much lantus he should take and if he should use or stop the glipizide for the procedure.    Pharmacy location:  n/a  Outside Imaging: n/a  Can we leave a detailed message on this number?  yes       no

## 2024-04-24 ENCOUNTER — APPOINTMENT (OUTPATIENT)
Dept: OBGYN | Facility: CLINIC | Age: 54
End: 2024-04-24